# Patient Record
Sex: MALE | Race: BLACK OR AFRICAN AMERICAN | NOT HISPANIC OR LATINO | Employment: FULL TIME | URBAN - NONMETROPOLITAN AREA
[De-identification: names, ages, dates, MRNs, and addresses within clinical notes are randomized per-mention and may not be internally consistent; named-entity substitution may affect disease eponyms.]

---

## 2024-06-14 ENCOUNTER — OFFICE VISIT (OUTPATIENT)
Dept: GASTROENTEROLOGY | Facility: CLINIC | Age: 24
End: 2024-06-14
Payer: COMMERCIAL

## 2024-06-14 VITALS
HEART RATE: 74 BPM | DIASTOLIC BLOOD PRESSURE: 80 MMHG | SYSTOLIC BLOOD PRESSURE: 118 MMHG | WEIGHT: 242 LBS | BODY MASS INDEX: 30.09 KG/M2 | OXYGEN SATURATION: 99 % | HEIGHT: 75 IN

## 2024-06-14 DIAGNOSIS — R19.4 CHANGE IN BOWEL HABITS: ICD-10-CM

## 2024-06-14 DIAGNOSIS — K62.5 BRBPR (BRIGHT RED BLOOD PER RECTUM): ICD-10-CM

## 2024-06-14 NOTE — PROGRESS NOTES
New Patient Consult      Date: 2024   Patient Name: Broderick Mcdonough  MRN: [id number]  : 2000     Referring Physician: Referring, Self    Chief Complaint   Patient presents with    Constipation       History of Present Illness: Broderick Mcdonough is a 23 y.o. male who is here today to establish care with Gastroenterology.    Has been noticing a change in bowel habits for the past few years.  Sometimes diarrhea, sometimes constipation.  Associated with some degree of discomfort sometimes in the rectal area, and perineum.  With straining there is sometimes blood noted in the stool as well.    His symptoms initially started as some urinary complaints, and possible penile discharge.  He was tested for STIs, and then he increasingly started noticing some discomfort around his scrotum, and perineum.  He also then noticed that his bowel habits were no longer regular.  Does not recall which antibiotic, but remembers being on an antibiotic around this time.    There is a family history of GI issues, albeit no formal diagnosis.  Several male family members have had bowel issues.    Subjective      History reviewed. No pertinent past medical history.    Past Surgical History:   Procedure Laterality Date    SHOULDER SURGERY         History reviewed. No pertinent family history.    Social History     Socioeconomic History    Marital status: Single   Tobacco Use    Smoking status: Every Day     Types: Cigarettes    Smokeless tobacco: Never   Vaping Use    Vaping status: Never Used   Substance and Sexual Activity    Alcohol use: Yes    Drug use: Never    Sexual activity: Defer         Current Outpatient Medications:     polyethylene glycol (GoLYTELY) 236 g solution, Take 4,000 mL by mouth 1 (One) Time for 1 dose. Please see prep instructions from office., Disp: 4000 mL, Rfl: 0    No Known Allergies    Review of Systems   Constitutional:  Negative for unexpected weight loss.   HENT:  Negative for trouble swallowing.   "  Gastrointestinal:  Positive for anal bleeding, blood in stool, constipation, diarrhea, rectal pain, GERD and indigestion. Negative for abdominal distention, abdominal pain, nausea and vomiting.       The following portions of the patient's history were reviewed and updated as appropriate: allergies, current medications, past family history, past medical history, past social history, past surgical history and problem list.    Objective     Physical Exam:  Vitals:    06/14/24 1039   BP: 118/80   Pulse: 74   SpO2: 99%   Weight: 110 kg (242 lb)   Height: 190.5 cm (75\")       Physical Exam  Constitutional:       General: He is not in acute distress.     Appearance: Normal appearance.   HENT:      Head: Normocephalic and atraumatic.      Mouth/Throat:      Mouth: Mucous membranes are moist.   Eyes:      General: No scleral icterus.     Conjunctiva/sclera: Conjunctivae normal.   Cardiovascular:      Rate and Rhythm: Normal rate.   Pulmonary:      Effort: Pulmonary effort is normal. No respiratory distress.   Abdominal:      General: There is no distension.      Tenderness: There is no abdominal tenderness.   Musculoskeletal:         General: No deformity or signs of injury.   Skin:     Coloration: Skin is not jaundiced or pale.   Neurological:      General: No focal deficit present.      Mental Status: He is alert and oriented to person, place, and time.   Psychiatric:         Mood and Affect: Mood normal.         Behavior: Behavior normal.         Results Review:   I have reviewed the patient's new clinical and imaging results and agree with the interpretation.     No results found for any previous visit.      No radiology results for the last 90 days.    Assessment / Plan      Assessment & Plan:  Diagnoses and all orders for this visit:    1. BRBPR (bright red blood per rectum)  -     Follow Anesthesia Guidelines / Protocol; Future  -     Obtain Informed Consent; Future  -     Case Request; Standing  -     Case " Request  -     polyethylene glycol (GoLYTELY) 236 g solution; Take 4,000 mL by mouth 1 (One) Time for 1 dose. Please see prep instructions from office.  Dispense: 4000 mL; Refill: 0    2. Change in bowel habits  -     Follow Anesthesia Guidelines / Protocol; Future  -     Obtain Informed Consent; Future  -     Case Request; Standing  -     Case Request  -     polyethylene glycol (GoLYTELY) 236 g solution; Take 4,000 mL by mouth 1 (One) Time for 1 dose. Please see prep instructions from office.  Dispense: 4000 mL; Refill: 0    Other orders  -     Follow Anesthesia Guidelines / Protocol; Standing  -     Verify NPO; Standing        Given alarm feature of change in bowel habits, we'll plan for a diagnostic colonoscopy to evaluate the above symptoms. Broad differential diagnosis. Includes, but is not limited to colitis, ileitis, structural pathology, strictures, and inflammatory disease, such as Crohn's or ulcerative colitis, polyps, malignancy.    Plan for colonoscopy with monitored anesthesia care. Counseled in detail regarding the risks, benefits and alternatives of the procedure, including but not limited to perforation, bleeding, infection, post-operative pain, complications from anesthesia, aspiration, cardiac decompensation, need for further procedures or surgery, which may occur in approximately 1 in 1000 procedures. Counseled also that endoscopy and colonoscopy are not perfect tests, and there is a possibility of missed diagnoses, including but not limited to polyps or cancer. Counseled regarding pre procedural instructions. Also discussed bowel preparation. Counseled regarding potential, albeit rare complications with bowel preparation specific to this patients medical history and medications, including but not limited to renal insufficiency/failure, electrolyte abnormalities, which may lead to other complications. Hydration is encouraged. Written instructions provided. Instructed to arrange for a ride home for  the day of procedure. Patient is in agreement with the above plan and voices understanding of the plan as well as the associated risks and the alternative evaluation/treatment/medication options.     Probiotic for 4 weeks after    Follow Up:   Return for Follow-up 3-6 months post procedure.    Abisai Joseph MD  [facility]    6/14/2024  11:51 EDT    Part of this note may be an electronic transcription/translation of spoken language to printed text using the Dragon Dictation System.

## 2024-06-17 PROBLEM — R19.4 CHANGE IN BOWEL HABITS: Status: ACTIVE | Noted: 2024-06-14

## 2024-06-17 PROBLEM — K62.5 BRBPR (BRIGHT RED BLOOD PER RECTUM): Status: ACTIVE | Noted: 2024-06-14

## 2024-07-02 ENCOUNTER — TELEPHONE (OUTPATIENT)
Dept: GASTROENTEROLOGY | Facility: CLINIC | Age: 24
End: 2024-07-02

## 2024-07-02 NOTE — TELEPHONE ENCOUNTER
Letter sent to patient.       Attempt #2      Tried to call and his phone is not accepting calls right now.     ----- Message from Arabella JAIME sent at 7/2/2024  1:50 PM EDT -----  Regarding: GENIE Kc,    Patient was scheduled for procedure today with Dr. Joseph but did not show.  However, there is a note stating from his mother that Patient was supposed to have called and canceled procedure.    Thanks.  Arabella   Mohs Method Verbiage: An incision at a 45 degree angle following the standard Mohs approach was done and the specimen was harvested as a microscopic controlled layer.